# Patient Record
Sex: MALE | URBAN - METROPOLITAN AREA
[De-identification: names, ages, dates, MRNs, and addresses within clinical notes are randomized per-mention and may not be internally consistent; named-entity substitution may affect disease eponyms.]

---

## 2022-05-20 ENCOUNTER — APPOINTMENT (RX ONLY)
Dept: URBAN - METROPOLITAN AREA CLINIC 154 | Facility: CLINIC | Age: 27
Setting detail: DERMATOLOGY
End: 2022-05-20

## 2022-05-20 DIAGNOSIS — Z41.9 ENCOUNTER FOR PROCEDURE FOR PURPOSES OTHER THAN REMEDYING HEALTH STATE, UNSPECIFIED: ICD-10-CM

## 2022-05-20 PROCEDURE — ? DIAGNOSIS COMMENT

## 2022-05-20 NOTE — PROCEDURE: DIAGNOSIS COMMENT
Detail Level: Simple
Comment: The patient is concerned with acne scarring. He is a good candidate for a series of microneedling. In the fall he would be a good candidate for profractional laser.
Render Risk Assessment In Note?: no

## 2022-06-23 ENCOUNTER — APPOINTMENT (RX ONLY)
Dept: URBAN - METROPOLITAN AREA CLINIC 154 | Facility: CLINIC | Age: 27
Setting detail: DERMATOLOGY
End: 2022-06-23

## 2022-06-23 DIAGNOSIS — Z41.9 ENCOUNTER FOR PROCEDURE FOR PURPOSES OTHER THAN REMEDYING HEALTH STATE, UNSPECIFIED: ICD-10-CM

## 2022-06-23 PROCEDURE — ? MICRONEEDLING

## 2022-06-23 NOTE — PROCEDURE: MICRONEEDLING
Length Of Topical Anesthesia Application (Optional): 20 minutes
Treatment Number (Optional): 1
Depth In Mm: 1.5
Location #1: FOREHEAD
Location #4: UNDER EYE AND NOSE
Topical Anesthesia?: 23% lidocaine, 7% tetracaine
Consent: Written consent obtained, risks reviewed including but not limited to pain, scarring, infection and incomplete improvement.  Patient understands the procedure is cosmetic in nature and will require out of pocket payment.
Detail Level: Zone
Infusions (Optional): growth factor
Location #2: CHEEKS
Post-Care Instructions: After the procedure, take precautions agains sun exposure. Do not apply sunscreen for 12 hours after the procedure. Do not apply make-up for 12 hours after the procedure. Avoid alcohol based toners for 10-14 days. After 2-3 days patients can return to their regular skin regimen.
Depth In Mm: 0.5
Location #3: UPPER LIP AND CHIN
Depth In Mm: 0.25